# Patient Record
Sex: MALE | Race: BLACK OR AFRICAN AMERICAN | NOT HISPANIC OR LATINO
[De-identification: names, ages, dates, MRNs, and addresses within clinical notes are randomized per-mention and may not be internally consistent; named-entity substitution may affect disease eponyms.]

---

## 2021-01-01 ENCOUNTER — APPOINTMENT (OUTPATIENT)
Dept: NEUROSURGERY | Facility: CLINIC | Age: 61
End: 2021-01-01
Payer: COMMERCIAL

## 2021-01-01 ENCOUNTER — APPOINTMENT (OUTPATIENT)
Dept: GERIATRICS | Facility: CLINIC | Age: 61
End: 2021-01-01

## 2021-01-01 VITALS
TEMPERATURE: 97.5 F | WEIGHT: 120 LBS | SYSTOLIC BLOOD PRESSURE: 118 MMHG | BODY MASS INDEX: 18.19 KG/M2 | HEART RATE: 114 BPM | HEIGHT: 68 IN | DIASTOLIC BLOOD PRESSURE: 86 MMHG

## 2021-01-01 DIAGNOSIS — S12.400A UNSPECIFIED DISPLACED FRACTURE OF FIFTH CERVICAL VERTEBRA, INITIAL ENCOUNTER FOR CLOSED FRACTURE: ICD-10-CM

## 2021-01-01 PROCEDURE — 99215 OFFICE O/P EST HI 40 MIN: CPT

## 2021-01-01 PROCEDURE — 99072 ADDL SUPL MATRL&STAF TM PHE: CPT

## 2021-04-20 PROBLEM — Z00.00 ENCOUNTER FOR PREVENTIVE HEALTH EXAMINATION: Status: ACTIVE | Noted: 2021-01-01

## 2021-05-17 PROBLEM — S12.400A C5 CERVICAL FRACTURE: Status: ACTIVE | Noted: 2021-01-01

## 2021-05-17 NOTE — PHYSICAL EXAM
[General Appearance - Alert] : alert [General Appearance - In No Acute Distress] : in no acute distress [Oriented To Time, Place, And Person] : oriented to person, place, and time [Impaired Insight] : insight and judgment were intact [Affect] : the affect was normal [Person] : oriented to person [Place] : oriented to place [Time] : oriented to time [Short Term Intact] : short term memory intact [Remote Intact] : remote memory intact [Span Intact] : the attention span was normal [Concentration Intact] : normal concentrating ability [Fluency] : fluency intact [Comprehension] : comprehension intact [Current Events] : adequate knowledge of current events [Past History] : adequate knowledge of personal past history [Vocabulary] : adequate range of vocabulary [Cranial Nerves Optic (II)] : visual acuity intact bilaterally,  pupils equal round and reactive to light [Cranial Nerves Oculomotor (III)] : extraocular motion intact [Cranial Nerves Trigeminal (V)] : facial sensation intact symmetrically [Cranial Nerves Facial (VII)] : face symmetrical [Cranial Nerves Vestibulocochlear (VIII)] : hearing was intact bilaterally [Cranial Nerves Glossopharyngeal (IX)] : tongue and palate midline [Cranial Nerves Hypoglossal (XII)] : there was no tongue deviation with protrusion [Cranial Nerves Accessory (XI - Cranial And Spinal)] : head turning and shoulder shrug symmetric [Motor Tone] : muscle tone was normal in all four extremities [Motor Strength] : muscle strength was normal in all four extremities [No Muscle Atrophy] : normal bulk in all four extremities [Sensation Tactile Decrease] : light touch was intact [Balance] : balance was intact [Abnormal Walk] : normal gait [2+] : Patella left 2+ [Past-pointing] : there was no past-pointing [Tremor] : no tremor present

## 2021-05-17 NOTE — ASSESSMENT
[FreeTextEntry1] : I have discussed the natural history and treatment options for C5 tear drop vertebral body fractures with the patient. I explained the indications for observation and imaging surveillance, medical management, and surgery.    I discussed the risks, benefits, possible complications and expected outcome related to each treatment option. In the end, I recommend that he d/c the cervical collar at this time, as his fracture is stable.  He can follow up in the office with us as needed. The patient understands the plan of care and is in agreement.  All questions answered to patient satisfaction.\par \par

## 2021-05-17 NOTE — END OF VISIT
[FreeTextEntry3] : I have seen the patient and reviewed the case together with PA and I agree with the final recommendations and plan of care.\par \par Morgan Mendiola MD\par Neurosurgery\par \par  [Time Spent: ___ minutes] : I have spent [unfilled] minutes of time on the encounter. [>50% of the face to face encounter time was spent on counseling and/or coordination of care for ___] : Greater than 50% of the face to face encounter time was spent on counseling and/or coordination of care for [unfilled]

## 2021-05-17 NOTE — REASON FOR VISIT
[FreeTextEntry1] : DAXA THOMAS is a 60 year male with a PMH of Stage V Pancreatic Ca s/p chemo taking opioids for chronic pain who was recently hospitalized at [facility] from 4/30-5/4 s/p fall backward with head strike who presents to the office today for neurosurgical consultation due to C5 vertebral body fracture sustained during his recent fall. He was placed in [place] collar and discharged home.  \par Today, he complains of some right sided neck pain, relieved with pain medicine.  The collar is uncomfortable for him. \par

## 2021-05-17 NOTE — DATA REVIEWED
[de-identified] : \par Exam: CTA HEAD (W)AW IC; CTA NECK (W)AW IC \par \par Order#: [id number]; [id number] \par \par \par \par PROCEDURE: CTA head with and without intravenous contrast. CTA neck with and without intravenous \par contrast. \par \par  INDICATION: Abnormal imaging findings of left internal carotid artery on recent imaging \par \par  TECHNIQUE: CT angiography of the head and neck was performed with and without administration of \par intravenous contrast. \par \par  Multiple thin section axial images were obtained of the head through the [place] prior to \par and following the intravenous injection of contrast. Multiple 3-D reformatted images were generated \par from the axial cuts. Postprocessing was performed on an independent workstation. \par \par  Multiple axial thin sections were obtained through the neck prior to and following the intravenous \par injection of contrast. Multiple 3-D reformatted images were generated from the axial cuts. \par Postprocessing was performed on an independent workstation. \par \par \par \par  100 mL of of Isovue 370 were administered intravenously. \par \par  COMPARISON: April 29, 2021 brain MR \par \par  FINDINGS: \par \par  CTA Head: \par \par  Please see recently performed separately dictated contrast-enhanced brain MR examination for more \par detailed discussion of intracranial findings. \par \par  Intracranial segments of bilateral internal carotid arteries, bilateral proximal middle cerebral \par arteries, proximal bilateral anterior cerebral arteries and ophthalmic arteries are patent without \par hemodynamically significant stenosis or dissection. \par \par  There is mild asymmetry of the petrous segment of the left internal carotid artery corresponding to\par findings on the April 29 2021 MR without significant vessel narrowing or aneurysmal dilatation. No \par focal linear filling defects identified within the left internal carotid artery to suggest a \par dissection flap or a double lumen from a focal acute dissection. \par \par  Superior and inferior divisions and proximal M2 segments the bilateral middle cerebral arteries are\par patent. \par \par  Anterior communicating artery is unremarkable. Right posterior communicating artery is present. \par \par  Basilar artery, proximal bilateral posterior cerebral arteries, proximal bilateral superior \par cerebellar arteries, proximal bilateral posterior inferior cerebellar arteries, V4 segments of the \par bilateral vertebral arteries, and bilateral proximal anterior cerebellar arteries are patent without\par hemodynamically significant stenosis or dissection. \par \par  No saccular intracranial aneurysm identified. No evidence for cerebral arteriovenous malformation. \par Dural venous sinuses appear to be patent. \par \par \par  CTA Neck: \par \par  Normal 3 vessel branch pattern present at aortic arch. There is separate origin of the left \par vertebral artery from the aortic arch, normal anatomical variant. \par \par  Origins of great vessels are patent. Origins of vertebral arteries are patent. Hypoplastic left \par vertebral artery. \par \par  Bilateral common carotid, bilateral common carotid, cervical segments of bilateral internal \par carotid, V1 through V3 segments of bilateral vertebral and bilateral carotid bifurcations are patent\par without hemodynamically significant stenosis or dissection. \par  There is dental amalgam with streak artifact, which obscures evaluation of the oral cavity within \par the aerodigestive tract. \par \par  No enlarged cervical lymph nodes by CT size criteria are identified. No cervical lymph nodes with \par areas of internal necrosis or calcification are identified. \par \par  Parotid, submandibular and thyroid glands are within normal limits. Visualized lung apices are \par unremarkable. \par \par  There are multilevel degenerative changes of cervical spine with multilevel disc osteophyte \par complexes and bilateral uncovertebral/facet hypertrophy which probably at the C4-C5 and C5-C6 level \par where there is mild to moderate canal stenosis and mild to moderate bilateral foraminal stenosis. \par \par  Soft tissue density present within left external auditory canal likely cerumen. \par \par \par  IMPRESSION: \par \par  No hemodynamically significant stenosis or definite dissection identified in proximal intracranial \par and neck vasculature including within the petrous segment of left internal carotid artery \par corresponding to previously described findings on April 29, 2021 MR exam. \par  No saccular intracranial aneurysm identified. \par  Please see recently performed separately dictated contrast-enhanced brain MR examination for more \par detailed discussion of intracranial findings. \par \par \par \par \par ***Electronically Signed *** \par ----------------------------------------------- \par Jos Bunch MD 05/03/21 [id number]  [de-identified] : Exam: MRI CERVICAL SPINE WAW IC \par Order#: [id number] \par \par \par \par I, Rosalia Jean MD, have reviewed the images and the report and agree with the findings with the \par additional modification: \par \par  Findings are most consistent with a flexion tear drop fracture. No evidence of spinal cord \par compression or spinal cord edema. There is an avulsion fracture of the inferior anterior endplate of\par C5. There is a tiny avulsion fracture of the posterior superior aspect of C6 that is flattening the \par ventral thecal sac. There is no evidence of spinal cord compression. There is mild edema seen at the\par anterior and inferior endplate of C5 and mild edema seen in the superior endplate of C6 (image #9 \par series 2). There is edema seen within the C5/C6 disc space. There is widening of the right C5/C6 \par facet joints with fluid. \par \par  There is fluid surrounding the lateral masses of C1 and C2 as well as mild edema seen within the \par cervical posterior paraspinal muscles and intraspinous ligaments from C3/C4-C7/T1. There is also \par prevertebral edema from C3/C4-C7/T1. \par \par  There is narrowing of the right C5/C6 neural foramen with flattening of the right vertebral. Mild \par flow-void at the C5/C6 level (image #17 and 18 series 5). If there is further clinical concern would\par recommend CTA for further characterization to exclude dissection. \par \par  Degenerative changes of the cervical spine. \par \par  [facility] PRELIMINARY ADDENDUM REPORT \par \par  Addendum created by John Condon MD on 4/29/2021 11:03:13 PM EDT: \par  THIS REPORT CONTAINS FINDINGS THAT MAY BE CRITICAL TO PATIENT CARE. The \par  findings were verbally communicated via telephone conference with Dr. Jennings at \par  11:03 PM EDT on 4/29/2021. The findings were acknowledged and understood. \par \par  Initial report created on 4/29/2021 10:59:41 PM EDT: \par \par  PROCEDURE INFORMATION: \par  Exam: MR Cervical Spine Without and With Contrast \par  Exam date and time: 4/29/2021 7:11 AM \par  Age: 60 years old \par  Clinical indication: Other: Displaced FX of c5 vertebral body \par \par  TECHNIQUE: \par  Imaging protocol: Multiplanar magnetic resonance images of the cervical spine \par  without and with contrast. \par \par  COMPARISON: \par  CT CERVICAL SPINE 4/29/2021 3:13 AM \par \par  FINDINGS: \par  Vertebrae: There is arthrosis involving the anterior atlantodental interval \par  with loss of joint space and marginal osteophyte formation and the odontoid \par  process appears intact. Focal avulsion fracture which appears to represent \par  recent injury is seen involving the anterior corner of the inferior endplate of \par  C5 with minimal displacement. There is mild retrolisthesis of C4 upon C5 and \par  there is is preservation of vertebral body height throughout the cervical spine \par  with no other acute cervical fractures detected. \par  Spinal cord: There is no cord compression or intramedullary signal \par  abnormalities detected at any cervical level. \par  C2-C3: Somewhat irregular broad-based posterior disc bulging indents the \par  ventral thecal sac without canal stenosis or cord compression. Neural foramina \par  are adequate at this level. \par  C3-C4: Broad-based osteocartilaginous ridging at this level is more prominent \par  to the right of midline producing mild asymmetric distortion of the thecal sac \par  without significant canal stenosis or cord compression. There is right \par  foraminal narrowing at C3-C4 with the left neural foramen adequate at this \par  level. \par  C4-C5: There is uncovering and broad-based posterior disc bulging at this level \par  which is more prominent to the left of midline producing asymmetric distortion \par  of the thecal sac without severe canal stenosis or evidence of cord \par  compression. Uncovertebral arthropathy produces left foraminal narrowing at \par  C4-C5 with the right neural foramen adequate at this level. \par  C5-C6: Focal fluid/edema is seen as hyperintensity on the STIR and T2 sequences \par  within the anterior C5-C6 disc space adjacent to the subtle minimally displaced \par  avulsion fracture involving the anterior corner of the inferior endplate of C5. \par  Broad-based posterior disc bulging indents the ventral thecal sac without \par  significant canal stenosis or cord compression and there is right foraminal \par  narrowing related uncovertebral arthropathy at C5-C6 with left neural foramen \par  adequate at this level. \par  C6-C7: There is broad-based posterior disc bulging/ osteocartilaginous ridging \par  at this level without focal protrusion with the central canal and neural \par  foramina adequate at C6-C7. \par  C7-T1: There is broad-based posterior osteocartilaginous ridging at the \par  cervicothoracic junction without focal protrusion and the central canal is \par  adequate at this level. There are mild bilateral foraminal distortions at \par  C7-T1. \par  Soft tissues: Fluid/edema is seen involving the anterior prevertebral soft \par  tissues between C3 and C7 in association with the suspected recent avulsion \par  fracture involving the inferior corner of the anterior endplate at C5. \par \par  Vertebral arteries: Expected flow voids in the vertebral arteries. \par \par \par  IMPRESSION: \par  1. Minimally displaced recent avulsion fracture seen involving the anterior \par  corner of the inferior endplate of C5 associated with fluid/edema involving the \par  anterior disc space and fluid tracking in the prevertebral soft tissues at mid \par  lower cervical levels as detailed above. No other acute or recent fractures are \par  detected. \par  2. Cervical spondylosis with multilevel posterior disc \par  bulging/osteocartilaginous ridging seen throughout the cervical spine without \par  associated canal stenosis or significant cord c dysphonia ompression. \par  Multilevel foraminal narrowings/distortions also evident as above. \par \par ***Electronically Signed *** \par ----------------------------------------------- \par Rosalia Jean MD 04/30/21 [id number] \par \par Dictated on 04/29/21